# Patient Record
Sex: FEMALE | Race: WHITE | NOT HISPANIC OR LATINO | URBAN - METROPOLITAN AREA
[De-identification: names, ages, dates, MRNs, and addresses within clinical notes are randomized per-mention and may not be internally consistent; named-entity substitution may affect disease eponyms.]

---

## 2017-12-22 ENCOUNTER — APPOINTMENT (RX ONLY)
Dept: URBAN - METROPOLITAN AREA CLINIC 142 | Facility: CLINIC | Age: 82
Setting detail: DERMATOLOGY
End: 2017-12-22

## 2017-12-22 DIAGNOSIS — L81.4 OTHER MELANIN HYPERPIGMENTATION: ICD-10-CM

## 2017-12-22 DIAGNOSIS — D18.0 HEMANGIOMA: ICD-10-CM

## 2017-12-22 DIAGNOSIS — L82.1 OTHER SEBORRHEIC KERATOSIS: ICD-10-CM

## 2017-12-22 DIAGNOSIS — Z85.828 PERSONAL HISTORY OF OTHER MALIGNANT NEOPLASM OF SKIN: ICD-10-CM

## 2017-12-22 DIAGNOSIS — L85.3 XEROSIS CUTIS: ICD-10-CM

## 2017-12-22 PROBLEM — D18.01 HEMANGIOMA OF SKIN AND SUBCUTANEOUS TISSUE: Status: ACTIVE | Noted: 2017-12-22

## 2017-12-22 PROBLEM — D48.5 NEOPLASM OF UNCERTAIN BEHAVIOR OF SKIN: Status: ACTIVE | Noted: 2017-12-22

## 2017-12-22 PROCEDURE — 99214 OFFICE O/P EST MOD 30 MIN: CPT | Mod: 25

## 2017-12-22 PROCEDURE — ? COUNSELING

## 2017-12-22 PROCEDURE — 11100: CPT

## 2017-12-22 PROCEDURE — ? BIOPSY BY SHAVE METHOD

## 2017-12-22 ASSESSMENT — LOCATION ZONE DERM
LOCATION ZONE: LEG
LOCATION ZONE: LIP
LOCATION ZONE: TRUNK
LOCATION ZONE: FACE

## 2017-12-22 ASSESSMENT — LOCATION DETAILED DESCRIPTION DERM
LOCATION DETAILED: RIGHT INFERIOR VERMILION LIP
LOCATION DETAILED: LEFT INFERIOR UPPER BACK
LOCATION DETAILED: LEFT PROXIMAL PRETIBIAL REGION
LOCATION DETAILED: INFERIOR THORACIC SPINE
LOCATION DETAILED: LEFT INFERIOR MEDIAL MALAR CHEEK
LOCATION DETAILED: RIGHT SUPERIOR MEDIAL MIDBACK

## 2017-12-22 ASSESSMENT — LOCATION SIMPLE DESCRIPTION DERM
LOCATION SIMPLE: LEFT UPPER BACK
LOCATION SIMPLE: RIGHT LIP
LOCATION SIMPLE: LEFT PRETIBIAL REGION
LOCATION SIMPLE: RIGHT LOWER BACK
LOCATION SIMPLE: LEFT CHEEK
LOCATION SIMPLE: UPPER BACK

## 2017-12-22 ASSESSMENT — PAIN INTENSITY VAS: HOW INTENSE IS YOUR PAIN 0 BEING NO PAIN, 10 BEING THE MOST SEVERE PAIN POSSIBLE?: NO PAIN

## 2017-12-22 NOTE — PROCEDURE: BIOPSY BY SHAVE METHOD
Billing Type: Third-Party Bill
Type Of Destruction Used: Curettage
X Size Of Lesion In Cm: 0
Electrodesiccation And Curettage Text: The wound bed was treated with electrodesiccation and curettage after the biopsy was performed.
Bill For Surgical Tray: no
Curettage Text: The wound bed was treated with curettage after the biopsy was performed.
Cryotherapy Text: The wound bed was treated with cryotherapy after the biopsy was performed.
Post-Care Instructions: I reviewed with the patient in detail post-care instructions. Patient is to keep the biopsy site dry overnight, and then apply bacitracin twice daily until healed. Patient may apply hydrogen peroxide soaks to remove any crusting.
Biopsy Type: H and E
Lab: [id number]
Notification Instructions: Patient will be notified of biopsy results. However, patient instructed to call the office if not contacted within 2 weeks.
Biopsy Method: Personna blade
Silver Nitrate Text: The wound bed was treated with silver nitrate after the biopsy was performed.
Render Post-Care Instructions In Note?: yes
Wound Care: Vaseline
Hemostasis: Drysol
Anesthesia Type: 1% lidocaine without epinephrine
Detail Level: Detailed
Consent: Verbal consent was obtained and risks were reviewed including but not limited to scarring, infection, bleeding, scabbing, incomplete removal, nerve damage and allergy to anesthesia.
Dressing: pressure dressing with telfa
Electrodesiccation Text: The wound bed was treated with electrodesiccation after the biopsy was performed.
Lab Facility: [id number]
Anesthesia Volume In Cc (Will Not Render If 0): 0.5

## 2018-01-10 ENCOUNTER — APPOINTMENT (RX ONLY)
Dept: URBAN - METROPOLITAN AREA CLINIC 142 | Facility: CLINIC | Age: 83
Setting detail: DERMATOLOGY
End: 2018-01-10

## 2018-01-10 DIAGNOSIS — L57.0 ACTINIC KERATOSIS: ICD-10-CM

## 2018-01-10 PROCEDURE — 17000 DESTRUCT PREMALG LESION: CPT

## 2018-01-10 PROCEDURE — ? LIQUID NITROGEN

## 2018-01-10 ASSESSMENT — PAIN INTENSITY VAS: HOW INTENSE IS YOUR PAIN 0 BEING NO PAIN, 10 BEING THE MOST SEVERE PAIN POSSIBLE?: NO PAIN

## 2018-01-10 ASSESSMENT — LOCATION DETAILED DESCRIPTION DERM: LOCATION DETAILED: LEFT LOWER CUTANEOUS LIP

## 2018-01-10 ASSESSMENT — LOCATION SIMPLE DESCRIPTION DERM: LOCATION SIMPLE: LEFT LIP

## 2018-01-10 ASSESSMENT — LOCATION ZONE DERM: LOCATION ZONE: LIP

## 2018-01-10 NOTE — PROCEDURE: LIQUID NITROGEN
Post-Care Instructions: I reviewed with the patient in detail post-care instructions. Patient is to wear sunprotection, and avoid picking at any of the treated lesions. Pt may apply Vaseline to crusted or scabbing areas.
Render Post-Care Instructions In Note?: yes
Number Of Freeze-Thaw Cycles: 2 freeze-thaw cycles
Detail Level: Detailed
Consent: The patient's verbal consent was obtained including but not limited to risks of crusting, scabbing, blistering, scarring, darker or lighter pigmentary change, recurrence, incomplete removal and infection.
Duration Of Freeze Thaw-Cycle (Seconds): 15

## 2018-02-20 ENCOUNTER — HOSPITAL ENCOUNTER (INPATIENT)
Dept: HOSPITAL 96 - M.ERS | Age: 83
LOS: 1 days | Discharge: HOME | DRG: 194 | End: 2018-02-21
Attending: INTERNAL MEDICINE | Admitting: INTERNAL MEDICINE
Payer: COMMERCIAL

## 2018-02-20 VITALS — SYSTOLIC BLOOD PRESSURE: 158 MMHG | DIASTOLIC BLOOD PRESSURE: 80 MMHG

## 2018-02-20 VITALS — SYSTOLIC BLOOD PRESSURE: 132 MMHG | DIASTOLIC BLOOD PRESSURE: 69 MMHG

## 2018-02-20 VITALS — BODY MASS INDEX: 21.34 KG/M2 | WEIGHT: 125 LBS | HEIGHT: 64.02 IN

## 2018-02-20 VITALS — SYSTOLIC BLOOD PRESSURE: 143 MMHG | DIASTOLIC BLOOD PRESSURE: 63 MMHG

## 2018-02-20 VITALS — DIASTOLIC BLOOD PRESSURE: 88 MMHG | SYSTOLIC BLOOD PRESSURE: 208 MMHG

## 2018-02-20 DIAGNOSIS — S09.90XA: ICD-10-CM

## 2018-02-20 DIAGNOSIS — Z90.11: ICD-10-CM

## 2018-02-20 DIAGNOSIS — E03.9: ICD-10-CM

## 2018-02-20 DIAGNOSIS — E44.0: ICD-10-CM

## 2018-02-20 DIAGNOSIS — Z88.5: ICD-10-CM

## 2018-02-20 DIAGNOSIS — M19.90: ICD-10-CM

## 2018-02-20 DIAGNOSIS — J09.X2: Primary | ICD-10-CM

## 2018-02-20 DIAGNOSIS — I10: ICD-10-CM

## 2018-02-20 DIAGNOSIS — Z88.2: ICD-10-CM

## 2018-02-20 DIAGNOSIS — Z91.041: ICD-10-CM

## 2018-02-20 DIAGNOSIS — R26.81: ICD-10-CM

## 2018-02-20 DIAGNOSIS — Z79.899: ICD-10-CM

## 2018-02-20 LAB
ABSOLUTE BASOPHILS: 0.1 THOU/UL (ref 0–0.2)
ABSOLUTE EOSINOPHILS: 0 THOU/UL (ref 0–0.7)
ABSOLUTE MONOCYTES: 0.5 THOU/UL (ref 0–1.2)
ALBUMIN SERPL-MCNC: 3.7 G/DL (ref 3.4–5)
ALP SERPL-CCNC: 65 U/L (ref 46–116)
ALT SERPL-CCNC: 18 U/L (ref 30–65)
ANION GAP SERPL CALC-SCNC: 7 MMOL/L (ref 7–16)
AST SERPL-CCNC: 18 U/L (ref 15–37)
BASOPHILS NFR BLD AUTO: 1.2 %
BILIRUB SERPL-MCNC: 0.6 MG/DL
BUN SERPL-MCNC: 15 MG/DL (ref 7–18)
CALCIUM SERPL-MCNC: 9.6 MG/DL (ref 8.5–10.1)
CHLORIDE SERPL-SCNC: 103 MMOL/L (ref 98–107)
CO2 SERPL-SCNC: 29 MMOL/L (ref 21–32)
CREAT SERPL-MCNC: 1.1 MG/DL (ref 0.6–1.3)
EOSINOPHIL NFR BLD: 0.2 %
GLUCOSE SERPL-MCNC: 101 MG/DL (ref 70–99)
GRANULOCYTES NFR BLD MANUAL: 75.2 %
HCT VFR BLD CALC: 43.7 % (ref 37–47)
HGB BLD-MCNC: 14.5 GM/DL (ref 12–15)
LYMPHOCYTES # BLD: 0.8 THOU/UL (ref 0.8–5.3)
LYMPHOCYTES NFR BLD AUTO: 15.1 %
MCH RBC QN AUTO: 29.1 PG (ref 26–34)
MCHC RBC AUTO-ENTMCNC: 33.2 G/DL (ref 28–37)
MCV RBC: 87.7 FL (ref 80–100)
MONOCYTES NFR BLD: 8.3 %
MPV: 7.4 FL. (ref 7.2–11.1)
NEUTROPHILS # BLD: 4.1 THOU/UL (ref 1.6–8.1)
NUCLEATED RBCS: 0 /100WBC
PLATELET COUNT*: 275 THOU/UL (ref 150–400)
POTASSIUM SERPL-SCNC: 4.1 MMOL/L (ref 3.5–5.1)
PROT SERPL-MCNC: 7.1 G/DL (ref 6.4–8.2)
RBC # BLD AUTO: 4.98 MIL/UL (ref 4.2–5)
RDW-CV: 15.3 % (ref 10.5–14.5)
SODIUM SERPL-SCNC: 139 MMOL/L (ref 136–145)
TROPONIN-I LEVEL: <0.06 NG/ML (ref ?–0.06)
WBC # BLD AUTO: 5.5 THOU/UL (ref 4–11)

## 2018-02-20 NOTE — EKG
[place] [address]
Phone:  [phone]                     ELECTROCARDIOGRAM REPORT      
_______________________________________________________________________________
 
Name:       SHAKIRA LEE                     Room:           [address]    ADM [place]  
M.R.#:  [id number]      Account #:      [id number]  
Admission:  18     Attend Phys:    Rosi Chance MD 
Discharge:               Date of Birth:  [birth year]  
         Report #: [id number]
    [id number]
_______________________________________________________________________________
THIS REPORT FOR:  //name//                      
 
                         [facility] ED
                                       
Test Date:    2018               Test Time:    12:48:53
Pat Name:     SHAKIRA LEE               Department:   
Patient ID:   [id number]            Room:         [facility]
Gender:       F                        Technician:   PHILOMENA SALGADO
:          1934               Requested By: Keisha Rasmussen
Order Number: [id number] MD:   Hamiltno Loving
                                 Measurements
Intervals                              Axis          
Rate:         65                       P:            55
CT:           169                      QRS:          7
QRSD:         94                       T:            39
QT:           399                                    
QTc:          415                                    
                           Interpretive Statements
Sinus rhythm
Possible left atrial enlargement
Compared to ECG 2011 08:44:51
No significant changes
 
Electronically Signed On 2018 16:05:08 CST by Hmailton Loving
[email or web address]
 
 
 
 
 
 
 
 
 
 
 
 
 
 
 
 
 
 
  <ELECTRONICALLY SIGNED>
                                           By: Hamilton Loving MD, [facility]   
  18     1605
D: 18 1248   _____________________________________
T: 18 1248   Hamilton Loving MD, FACC     /EPI

## 2018-02-21 VITALS — SYSTOLIC BLOOD PRESSURE: 208 MMHG | DIASTOLIC BLOOD PRESSURE: 83 MMHG

## 2018-02-21 VITALS — SYSTOLIC BLOOD PRESSURE: 132 MMHG | DIASTOLIC BLOOD PRESSURE: 73 MMHG

## 2018-02-21 VITALS — SYSTOLIC BLOOD PRESSURE: 150 MMHG | DIASTOLIC BLOOD PRESSURE: 72 MMHG

## 2018-02-21 VITALS — SYSTOLIC BLOOD PRESSURE: 151 MMHG | DIASTOLIC BLOOD PRESSURE: 54 MMHG

## 2018-02-21 VITALS — DIASTOLIC BLOOD PRESSURE: 58 MMHG | SYSTOLIC BLOOD PRESSURE: 148 MMHG

## 2018-02-21 LAB
ALBUMIN SERPL-MCNC: 3 G/DL (ref 3.4–5)
ALP SERPL-CCNC: 53 U/L (ref 46–116)
ALT SERPL-CCNC: 14 U/L (ref 30–65)
ANION GAP SERPL CALC-SCNC: 8 MMOL/L (ref 7–16)
AST SERPL-CCNC: 19 U/L (ref 15–37)
BILIRUB SERPL-MCNC: 0.5 MG/DL
BILIRUB UR-MCNC: NEGATIVE MG/DL
BUN SERPL-MCNC: 14 MG/DL (ref 7–18)
CALCIUM SERPL-MCNC: 9 MG/DL (ref 8.5–10.1)
CHLORIDE SERPL-SCNC: 108 MMOL/L (ref 98–107)
CO2 SERPL-SCNC: 27 MMOL/L (ref 21–32)
COLOR UR: YELLOW
CREAT SERPL-MCNC: 1 MG/DL (ref 0.6–1.3)
GLUCOSE SERPL-MCNC: 85 MG/DL (ref 70–99)
HCT VFR BLD CALC: 39.6 % (ref 37–47)
HGB BLD-MCNC: 13 GM/DL (ref 12–15)
KETONES UR STRIP-MCNC: NEGATIVE MG/DL
MCH RBC QN AUTO: 28.6 PG (ref 26–34)
MCHC RBC AUTO-ENTMCNC: 32.9 G/DL (ref 28–37)
MCV RBC: 87 FL (ref 80–100)
MPV: 7.4 FL. (ref 7.2–11.1)
PLATELET COUNT*: 244 THOU/UL (ref 150–400)
POTASSIUM SERPL-SCNC: 4.1 MMOL/L (ref 3.5–5.1)
PROT SERPL-MCNC: 5.4 G/DL (ref 6.4–8.2)
PROT UR QL STRIP: NEGATIVE
RBC # BLD AUTO: 4.55 MIL/UL (ref 4.2–5)
RBC # UR STRIP: NEGATIVE /UL
RDW-CV: 15.4 % (ref 10.5–14.5)
SODIUM SERPL-SCNC: 143 MMOL/L (ref 136–145)
SP GR UR STRIP: 1.01 (ref 1–1.03)
URINE CLARITY: CLEAR
URINE GLUCOSE-RANDOM: NEGATIVE
URINE LEUKOCYTES-REFLEX: NEGATIVE
URINE NITRITE-REFLEX: NEGATIVE
UROBILINOGEN UR STRIP-ACNC: 0.2 E.U./DL (ref 0.2–1)
WBC # BLD AUTO: 3.7 THOU/UL (ref 4–11)

## 2018-06-26 ENCOUNTER — HOSPITAL ENCOUNTER (OUTPATIENT)
Dept: HOSPITAL 96 - M.LAB | Age: 83
End: 2018-06-26
Attending: FAMILY MEDICINE
Payer: COMMERCIAL

## 2018-06-26 DIAGNOSIS — G93.9: Primary | ICD-10-CM

## 2018-06-26 DIAGNOSIS — R90.82: ICD-10-CM

## 2018-06-26 LAB
BUN SERPL-MCNC: 21 MG/DL (ref 7–18)
CREAT SERPL-MCNC: 1 MG/DL (ref 0.6–1.3)

## 2018-08-13 ENCOUNTER — APPOINTMENT (RX ONLY)
Dept: URBAN - METROPOLITAN AREA CLINIC 142 | Facility: CLINIC | Age: 83
Setting detail: DERMATOLOGY
End: 2018-08-13

## 2018-08-13 DIAGNOSIS — L81.4 OTHER MELANIN HYPERPIGMENTATION: ICD-10-CM

## 2018-08-13 DIAGNOSIS — L82.0 INFLAMED SEBORRHEIC KERATOSIS: ICD-10-CM

## 2018-08-13 DIAGNOSIS — D69.2 OTHER NONTHROMBOCYTOPENIC PURPURA: ICD-10-CM

## 2018-08-13 DIAGNOSIS — Z85.828 PERSONAL HISTORY OF OTHER MALIGNANT NEOPLASM OF SKIN: ICD-10-CM

## 2018-08-13 DIAGNOSIS — D18.0 HEMANGIOMA: ICD-10-CM

## 2018-08-13 DIAGNOSIS — L82.1 OTHER SEBORRHEIC KERATOSIS: ICD-10-CM

## 2018-08-13 PROBLEM — D18.01 HEMANGIOMA OF SKIN AND SUBCUTANEOUS TISSUE: Status: ACTIVE | Noted: 2018-08-13

## 2018-08-13 PROCEDURE — ? COUNSELING

## 2018-08-13 PROCEDURE — ? LIQUID NITROGEN

## 2018-08-13 PROCEDURE — 99214 OFFICE O/P EST MOD 30 MIN: CPT | Mod: 25

## 2018-08-13 PROCEDURE — 17110 DESTRUCTION B9 LES UP TO 14: CPT

## 2018-08-13 ASSESSMENT — LOCATION DETAILED DESCRIPTION DERM
LOCATION DETAILED: EPIGASTRIC SKIN
LOCATION DETAILED: LEFT INFERIOR UPPER BACK
LOCATION DETAILED: RIGHT SUPERIOR MEDIAL UPPER BACK
LOCATION DETAILED: LEFT INFERIOR LATERAL NECK
LOCATION DETAILED: RIGHT PROXIMAL DORSAL FOREARM
LOCATION DETAILED: LEFT MEDIAL SUPERIOR CHEST
LOCATION DETAILED: LEFT DISTAL RADIAL DORSAL FOREARM
LOCATION DETAILED: LEFT SUPEROLATERAL SOFT PALATE
LOCATION DETAILED: LEFT DISTAL PRETIBIAL REGION

## 2018-08-13 ASSESSMENT — LOCATION SIMPLE DESCRIPTION DERM
LOCATION SIMPLE: LEFT UPPER BACK
LOCATION SIMPLE: CHEST
LOCATION SIMPLE: LEFT FOREARM
LOCATION SIMPLE: LEFT ANTERIOR NECK
LOCATION SIMPLE: ABDOMEN
LOCATION SIMPLE: RIGHT UPPER BACK
LOCATION SIMPLE: RIGHT FOREARM
LOCATION SIMPLE: LEFT SOFT PALATE
LOCATION SIMPLE: LEFT PRETIBIAL REGION

## 2018-08-13 ASSESSMENT — LOCATION ZONE DERM
LOCATION ZONE: LEG
LOCATION ZONE: TRUNK
LOCATION ZONE: ARM
LOCATION ZONE: OROPHARYNX
LOCATION ZONE: NECK

## 2018-08-13 ASSESSMENT — PAIN INTENSITY VAS: HOW INTENSE IS YOUR PAIN 0 BEING NO PAIN, 10 BEING THE MOST SEVERE PAIN POSSIBLE?: NO PAIN

## 2018-08-13 NOTE — PROCEDURE: LIQUID NITROGEN
Duration Of Freeze Thaw-Cycle (Seconds): 15
Add 52 Modifier (Optional): no
Medical Necessity Information: It is in your best interest to select a reason for this procedure from the list below. All of these items fulfill various CMS LCD requirements except the new and changing color options.
Render Post-Care Instructions In Note?: yes
Number Of Freeze-Thaw Cycles: 1 freeze-thaw cycle
Consent: The patient's verbal consent was obtained including but not limited to risks of crusting, scabbing, blistering, scarring, darker or lighter pigmentary change, recurrence, incomplete removal and infection.
Medical Necessity Clause: This procedure was medically necessary because the lesions that were treated were:
Post-Care Instructions: I reviewed with the patient in detail post-care instructions. Patient is to wear sunprotection, and avoid picking at any of the treated lesions. Pt may apply Vaseline to crusted or scabbing areas.
Detail Level: Detailed

## 2018-09-13 ENCOUNTER — HOSPITAL ENCOUNTER (INPATIENT)
Dept: HOSPITAL 96 - M.ERS | Age: 83
LOS: 4 days | Discharge: HOME | DRG: 65 | End: 2018-09-17
Attending: INTERNAL MEDICINE | Admitting: INTERNAL MEDICINE
Payer: COMMERCIAL

## 2018-09-13 VITALS — WEIGHT: 126 LBS | BODY MASS INDEX: 21.51 KG/M2 | HEIGHT: 64.02 IN

## 2018-09-13 VITALS — SYSTOLIC BLOOD PRESSURE: 225 MMHG | DIASTOLIC BLOOD PRESSURE: 96 MMHG

## 2018-09-13 VITALS — DIASTOLIC BLOOD PRESSURE: 80 MMHG | SYSTOLIC BLOOD PRESSURE: 179 MMHG

## 2018-09-13 VITALS — SYSTOLIC BLOOD PRESSURE: 191 MMHG | DIASTOLIC BLOOD PRESSURE: 81 MMHG

## 2018-09-13 VITALS — SYSTOLIC BLOOD PRESSURE: 196 MMHG | DIASTOLIC BLOOD PRESSURE: 71 MMHG

## 2018-09-13 VITALS — DIASTOLIC BLOOD PRESSURE: 95 MMHG | SYSTOLIC BLOOD PRESSURE: 227 MMHG

## 2018-09-13 DIAGNOSIS — Z90.49: ICD-10-CM

## 2018-09-13 DIAGNOSIS — I67.4: ICD-10-CM

## 2018-09-13 DIAGNOSIS — Z79.82: ICD-10-CM

## 2018-09-13 DIAGNOSIS — I16.1: ICD-10-CM

## 2018-09-13 DIAGNOSIS — Z88.6: ICD-10-CM

## 2018-09-13 DIAGNOSIS — Z91.041: ICD-10-CM

## 2018-09-13 DIAGNOSIS — R20.2: ICD-10-CM

## 2018-09-13 DIAGNOSIS — Z90.11: ICD-10-CM

## 2018-09-13 DIAGNOSIS — I16.0: ICD-10-CM

## 2018-09-13 DIAGNOSIS — Z85.818: ICD-10-CM

## 2018-09-13 DIAGNOSIS — E03.9: ICD-10-CM

## 2018-09-13 DIAGNOSIS — I10: ICD-10-CM

## 2018-09-13 DIAGNOSIS — Z88.2: ICD-10-CM

## 2018-09-13 DIAGNOSIS — Z79.899: ICD-10-CM

## 2018-09-13 DIAGNOSIS — Z85.3: ICD-10-CM

## 2018-09-13 DIAGNOSIS — H81.49: ICD-10-CM

## 2018-09-13 DIAGNOSIS — M19.90: ICD-10-CM

## 2018-09-13 DIAGNOSIS — E78.5: ICD-10-CM

## 2018-09-13 DIAGNOSIS — I63.9: Primary | ICD-10-CM

## 2018-09-13 LAB
ALBUMIN SERPL-MCNC: 3.5 G/DL (ref 3.4–5)
ALP SERPL-CCNC: 61 U/L (ref 46–116)
ALT SERPL-CCNC: 19 U/L (ref 30–65)
ANION GAP SERPL CALC-SCNC: 6 MMOL/L (ref 7–16)
APTT BLD: 25.7 SECONDS (ref 25–31.3)
AST SERPL-CCNC: 17 U/L (ref 15–37)
BILIRUB SERPL-MCNC: 0.3 MG/DL
BILIRUB UR-MCNC: NEGATIVE MG/DL
BUN SERPL-MCNC: 15 MG/DL (ref 7–18)
CALCIUM SERPL-MCNC: 8.8 MG/DL (ref 8.5–10.1)
CHLORIDE SERPL-SCNC: 101 MMOL/L (ref 98–107)
CO2 SERPL-SCNC: 28 MMOL/L (ref 21–32)
COLOR UR: YELLOW
CREAT SERPL-MCNC: 1.2 MG/DL (ref 0.6–1.3)
GLUCOSE SERPL-MCNC: 103 MG/DL (ref 70–99)
HCT VFR BLD CALC: 42.8 % (ref 37–47)
HGB BLD-MCNC: 14.1 GM/DL (ref 12–15)
INR PPP: 1
KETONES UR STRIP-MCNC: NEGATIVE MG/DL
MCH RBC QN AUTO: 28.8 PG (ref 26–34)
MCHC RBC AUTO-ENTMCNC: 33 G/DL (ref 28–37)
MCV RBC: 87.3 FL (ref 80–100)
MPV: 7.3 FL. (ref 7.2–11.1)
NITRITE UR QL STRIP: NEGATIVE
PLATELET COUNT*: 251 THOU/UL (ref 150–400)
POTASSIUM SERPL-SCNC: 3.8 MMOL/L (ref 3.5–5.1)
PROT SERPL-MCNC: 6.8 G/DL (ref 6.4–8.2)
PROT UR QL STRIP: NEGATIVE
PROTHROMBIN TIME: 10 SECONDS (ref 9.2–11.5)
RBC # BLD AUTO: 4.9 MIL/UL (ref 4.2–5)
RBC # UR STRIP: NEGATIVE /UL
RDW-CV: 15.4 % (ref 10.5–14.5)
SODIUM SERPL-SCNC: 135 MMOL/L (ref 136–145)
SP GR UR STRIP: <= 1.005 (ref 1–1.03)
SQUAMOUS: (no result) /LPF (ref 0–3)
TROPONIN-I LEVEL: <0.06 NG/ML (ref ?–0.06)
URINE CLARITY: CLEAR
URINE GLUCOSE-RANDOM: NEGATIVE
URINE LEUKOCYTES: (no result)
UROBILINOGEN UR STRIP-ACNC: 0.2 E.U./DL (ref 0.2–1)
WBC # BLD AUTO: 5 THOU/UL (ref 4–11)
WBC #/AREA URNS HPF: (no result) /HPF (ref 0–5)

## 2018-09-14 VITALS — DIASTOLIC BLOOD PRESSURE: 64 MMHG | SYSTOLIC BLOOD PRESSURE: 144 MMHG

## 2018-09-14 VITALS — DIASTOLIC BLOOD PRESSURE: 92 MMHG | SYSTOLIC BLOOD PRESSURE: 179 MMHG

## 2018-09-14 VITALS — DIASTOLIC BLOOD PRESSURE: 79 MMHG | SYSTOLIC BLOOD PRESSURE: 150 MMHG

## 2018-09-14 VITALS — DIASTOLIC BLOOD PRESSURE: 69 MMHG | SYSTOLIC BLOOD PRESSURE: 167 MMHG

## 2018-09-14 VITALS — DIASTOLIC BLOOD PRESSURE: 75 MMHG | SYSTOLIC BLOOD PRESSURE: 148 MMHG

## 2018-09-14 VITALS — SYSTOLIC BLOOD PRESSURE: 191 MMHG | DIASTOLIC BLOOD PRESSURE: 66 MMHG

## 2018-09-14 VITALS — SYSTOLIC BLOOD PRESSURE: 149 MMHG | DIASTOLIC BLOOD PRESSURE: 60 MMHG

## 2018-09-14 VITALS — DIASTOLIC BLOOD PRESSURE: 50 MMHG | SYSTOLIC BLOOD PRESSURE: 123 MMHG

## 2018-09-14 LAB
ANION GAP SERPL CALC-SCNC: 6 MMOL/L (ref 7–16)
BUN SERPL-MCNC: 13 MG/DL (ref 7–18)
CALCIUM SERPL-MCNC: 9 MG/DL (ref 8.5–10.1)
CHLORIDE SERPL-SCNC: 104 MMOL/L (ref 98–107)
CHOLEST SERPL-MCNC: 218 MG/DL (ref ?–200)
CO2 SERPL-SCNC: 29 MMOL/L (ref 21–32)
CREAT SERPL-MCNC: 1.2 MG/DL (ref 0.6–1.3)
ESR (SEDRATE): 8 MM/HR (ref 0–30)
EST. AVERAGE GLUCOSE BLD GHB EST-MCNC: 103 MG/DL
GLUCOSE SERPL-MCNC: 90 MG/DL (ref 70–99)
GLYCOHEMOGLOBIN (HGB A1C): 5.2 % (ref 4.8–5.6)
HCT VFR BLD CALC: 40.7 % (ref 37–47)
HDLC SERPL-MCNC: 79 MG/DL (ref 40–?)
HGB BLD-MCNC: 13.6 GM/DL (ref 12–15)
LDLC SERPL-MCNC: 131 MG/DL (ref ?–100)
MAGNESIUM SERPL-MCNC: 2.2 MG/DL (ref 1.8–2.4)
MCH RBC QN AUTO: 29.4 PG (ref 26–34)
MCHC RBC AUTO-ENTMCNC: 33.4 G/DL (ref 28–37)
MCV RBC: 88 FL (ref 80–100)
MPV: 7.4 FL. (ref 7.2–11.1)
PLATELET COUNT*: 240 THOU/UL (ref 150–400)
POTASSIUM SERPL-SCNC: 4.1 MMOL/L (ref 3.5–5.1)
RBC # BLD AUTO: 4.63 MIL/UL (ref 4.2–5)
RDW-CV: 15.1 % (ref 10.5–14.5)
SODIUM SERPL-SCNC: 139 MMOL/L (ref 136–145)
TC:HDL: 2.8 RATIO
TRIGL SERPL-MCNC: 44 MG/DL (ref ?–150)
VLDLC SERPL CALC-MCNC: 9 MG/DL (ref ?–40)
WBC # BLD AUTO: 4.6 THOU/UL (ref 4–11)

## 2018-09-14 NOTE — EKG
[place] [address]
Phone:  [phone]                     ELECTROCARDIOGRAM REPORT      
_______________________________________________________________________________
 
Name:       SHAKIRA LEE                     Room:           [address]    ADM IN  
.R.#:  [id number]      Account #:      [id number]  
Admission:  18     Attend Phys:    Guru Hopkins MD 
Discharge:               Date of Birth:  [birth year]  
         Report #: [id number]
    [id number]
_______________________________________________________________________________
THIS REPORT FOR:  //name//                      
 
                         [facility] ED
                                       
Test Date:    2018               Test Time:    17:50:51
Pat Name:     SHAKIRA LEE               Department:   
Patient ID:   [id number]            Room:         [facility]
Gender:       F                        Technician:   
:          1934               Requested By: Ernestine Ackerman
Order Number: [id number] MD:   Rubén Napier
                                 Measurements
Intervals                              Axis          
Rate:         76                       P:            76
MD:           169                      QRS:          -2
QRSD:         96                       T:            38
QT:           366                                    
QTc:          412                                    
                           Interpretive Statements
Sinus rhythm
Compared to ECG 2018 12:48:53
No significant changes
 
Electronically Signed On 2018 10:55:10 CDT by Rubén Napier
[email or web address]
 
 
 
 
 
 
 
 
 
 
 
 
 
 
 
 
 
 
 
  <ELECTRONICALLY SIGNED>
                                           By: Rubén Napier MD, [facility]      
  18     1055
D: 18 1750   _____________________________________
T: 18 1750   Rubén Napier MD, [facility]        /EPI

## 2018-09-14 NOTE — 2DMMODE
[place] [address]
Phone:  [phone] 2 D/M-MODE ECHOCARDIOGRAM     
_______________________________________________________________________________
 
Name:         SHAKIRA LEE                    Room:          [address] [place] 
[facility]#:    [id number]     Account #:     [id number]  
Admission:    18    Attend Phys:   Guru Hopkins, 
Discharge:                Date of Birth: [birth year]  
Date of Service: 18 1131  Report #:      [id number]
        [id number]
_______________________________________________________________________________
THIS REPORT FOR:  //name//                      
 
 
--------------- APPROVED REPORT --------------
 
 
Study performed:  2018 10:48:31
 
EXAM: Comprehensive 2D, Doppler, and color-flow 
Echocardiogram 
Patient Location: In-Patient   
Room #:  [facility]     Status:  routine
 
      BSA:         1.62
HR: 68 bpm BP:          144/64 mmHg 
Rhythm: NSR     
 
Other Information 
Study Quality: Excellent
 
Indications
CVA/TIA 
 
Echo Enhancing Agent
Indication: Rule out Shunt
Agent(s) / Amount(s) Used: Agitated Saline 10 cc
 
2D Dimensions
IVSd:  12.81 (7-11mm) LVOT Diam:  19.92 (18-24mm) 
LVDd:  39.74 mm  
PWd:  10.28 (7-11mm) Ascending Ao:  29.09 (22-36mm)
LVDs:  22.44 (25-40mm) 
Aortic Root:  29.07 mm 
 
Volumes
Left Atrial Volume (Systole) 
    LA ESV Index:  26.50 mL/m2
 
Aortic Valve
AoV Peak Sagar.:  1.49 m/s 
AO Peak Gr.:  8.82 mmHg  LVOT Max P.42 mmHg
AO Mean Gr.:  4.73 mmHg  LVOT Mean P.00 mmHg
    LVOT Max V:  1.05 m/s
AO V2 VTI:  31.02 cm  LVOT Mean V:  0.64 m/s
ROSEANN (VTI):  2.36 cm2  LVOT V1 VTI:  23.47 cm
 
 
 
[place] [address]
Phone:  [phone]                     2 D/M-MODE ECHOCARDIOGRAM     
_______________________________________________________________________________
 
Name:         BRYAN LEENA                    Room:          [address] [place] 
..#:    [id number]     Account #:     [id number]  
Admission:    18    Attend Phys:   Guru Hopkins, 
Discharge:                Date of Birth: [birth year]  
Date of Service: 18 1131  Report #:      [id number]
        [id number]
_______________________________________________________________________________
Mitral Valve
    E/A Ratio:  0.58
    MV Decel. Time:  335.65 ms
MV E Max Sagar.:  0.56 m/s 
MV PHT:  97.34 ms  
MVA (PHT):  2.26 cm2  
 
TDI
E/Lateral E':  7.00 E/Medial E':  8.00
   Medial E' Sagar.:  0.07 m/s
   Lateral E' Sagar.:  0.08 m/s
 
Pulmonary Valve
PV Peak Sagar.:  0.92 m/s PV Peak Gr.:  3.36 mmHg
 
Tricuspid Valve
    RAP Estimate:  5.00 mmHg
TR Peak Gr.:  18.51 mmHg RVSP:  23.50 mmHg
    PA Pressure:  23.50 mmHg
 
Left Ventricle
The left ventricle is normal size. There is normal LV segmental wall 
motion. There is normal left ventricular wall thickness. Left 
ventricular systolic function is normal. The left ventricular 
ejection fraction is within the normal range. LVEF is 60-65%. The 
left ventricular diastolic function is normal.
 
Right Ventricle
The right ventricle is normal size. The right ventricular systolic 
function is normal.
 
Atria
The left atrium size is normal. The right atrium size is 
normal.
 
Aortic Valve
Mild aortic valve sclerosis. No aortic regurgitation is present. 
There is no aortic valvular stenosis.
 
Mitral Valve
The mitral valve is normal in structure. There is no mitral valve 
regurgitation noted. No evidence of mitral valve stenosis.
 
Tricuspid Valve
The tricuspid valve is normal in structure. Trace tricuspid 
regurgitation. No pulmonary hypertension.
 
 
[place] [address]
Phone:  [phone]                     2 D/M-MODE ECHOCARDIOGRAM     
_______________________________________________________________________________
 
Name:         SHAKIRA LEE                    Room:          [address] [place] 
[facility]#:    [id number]     Account #:     [id number]  
Admission:    18    Attend Phys:   Guru Hopkins, 
Discharge:                Date of Birth: [birth year]  
Date of Service: 18 1131  Report #:      [id number]
        [id number]
_______________________________________________________________________________
 
Pulmonic Valve
The pulmonary valve is normal in structure. There is no pulmonic 
valvular regurgitation.
 
Great Vessels
The aortic root is normal in size. IVC is normal in size and 
collapses with &gt;50% inspiration
 
Pericardium
There is no pericardial effusion.
 
&lt;Conclusion&gt;
Left ventricular systolic function is normal.
The left ventricular ejection fraction is within the normal range.
 
 
 
 
 
 
 
 
 
 
 
 
 
 
 
 
 
 
 
 
 
 
 
 
 
 
 
 
 
  <ELECTRONICALLY SIGNED>
                                           By: Rubén Napier MD, FACC      
  18     1131
D: 18 1131   _____________________________________
T: 18 113   Rubén Napier MD, FACC        /INF

## 2018-09-15 VITALS — SYSTOLIC BLOOD PRESSURE: 166 MMHG | DIASTOLIC BLOOD PRESSURE: 76 MMHG

## 2018-09-15 VITALS — SYSTOLIC BLOOD PRESSURE: 190 MMHG | DIASTOLIC BLOOD PRESSURE: 75 MMHG

## 2018-09-15 VITALS — DIASTOLIC BLOOD PRESSURE: 56 MMHG | SYSTOLIC BLOOD PRESSURE: 137 MMHG

## 2018-09-15 VITALS — DIASTOLIC BLOOD PRESSURE: 66 MMHG | SYSTOLIC BLOOD PRESSURE: 177 MMHG

## 2018-09-15 VITALS — DIASTOLIC BLOOD PRESSURE: 72 MMHG | SYSTOLIC BLOOD PRESSURE: 181 MMHG

## 2018-09-15 VITALS — SYSTOLIC BLOOD PRESSURE: 166 MMHG | DIASTOLIC BLOOD PRESSURE: 73 MMHG

## 2018-09-16 VITALS — DIASTOLIC BLOOD PRESSURE: 54 MMHG | SYSTOLIC BLOOD PRESSURE: 188 MMHG

## 2018-09-16 VITALS — SYSTOLIC BLOOD PRESSURE: 125 MMHG | DIASTOLIC BLOOD PRESSURE: 65 MMHG

## 2018-09-16 VITALS — DIASTOLIC BLOOD PRESSURE: 66 MMHG | SYSTOLIC BLOOD PRESSURE: 158 MMHG

## 2018-09-16 VITALS — DIASTOLIC BLOOD PRESSURE: 82 MMHG | SYSTOLIC BLOOD PRESSURE: 189 MMHG

## 2018-09-16 VITALS — SYSTOLIC BLOOD PRESSURE: 182 MMHG | DIASTOLIC BLOOD PRESSURE: 72 MMHG

## 2018-09-16 VITALS — DIASTOLIC BLOOD PRESSURE: 80 MMHG | SYSTOLIC BLOOD PRESSURE: 184 MMHG

## 2018-09-17 VITALS — SYSTOLIC BLOOD PRESSURE: 126 MMHG | DIASTOLIC BLOOD PRESSURE: 52 MMHG

## 2018-09-17 VITALS — SYSTOLIC BLOOD PRESSURE: 154 MMHG | DIASTOLIC BLOOD PRESSURE: 69 MMHG

## 2018-09-17 VITALS — SYSTOLIC BLOOD PRESSURE: 161 MMHG | DIASTOLIC BLOOD PRESSURE: 68 MMHG

## 2018-09-17 VITALS — DIASTOLIC BLOOD PRESSURE: 69 MMHG | SYSTOLIC BLOOD PRESSURE: 131 MMHG

## 2018-09-21 NOTE — CON
[address]  [address]                    CONSULTATION                  
_______________________________________________________________________________
 
Name:       SHAKIRA LEE                     Room:           [address] [place]  
M.R.#:  [id number]      Account #:      [id number]  
Admission:  09/13/18     Attend Phys:    Guru Hopkins MD 
Discharge:  09/17/18     Date of Birth:  [birth year]  
         Report #: [id number]
                                                                     [id number]  
_______________________________________________________________________________
THIS REPORT FOR:  //name//                      
 
CC: Guru Severino Pennieankit
 
DATE OF SERVICE:  09/14/2018
 
 
HISTORY OF PRESENT ILLNESS:  This is an 84-year-old female patient for whom a
Neurology consultation was done tonight.  The history originally I got from the
nurse is that they noticed numbness in the left arm around 7:45.  That history
is not clear by talking to the patient as I will explain later.  As I understood
from them, subsequently the patient tried to walk and they noticed weakness
because she was not able to walk.  As per protocol, code stroke was activated
and since it was after hours as per protocol, emergency room physician was
called.  Dr. Wyatt saw the patient as a stroke protocol and evaluated the patient.
 He did a noncontrast CT of the head and it was unremarkable.  He evaluated the
patient for TPA.  The patient was outside the window for 3 hours and as I
understood from him, she did not qualify for 4-1/2 hour window because she was
more than 80-year-old and as per protocol, she was excluded from the 4-1/2 hour
window.  As I understood from him, he ordered a CT stroke protocol.  He also
recommended they contact me and the floor contacted me around 11:30 and around
that time is the one I talked to Dr. Wyatt.  History is basically as summarized
above.
 
When I asked the patient about the timing, she is not sure.  She actually says
the numbness may have happened even before 7 o'clock.  She did not realize she
was weak till she tried to walk, so there may be some neglect there and the
symptom may be going on much longer than previously thought and the time cannot
be determined with accuracy.  She is weak, but still able to move the left side.
 She is numb.  I talked to the nurses, her blood pressure was about 120
systolic, just prior to when they took it and during the episode, the blood
pressure was still relatively low and the systolic was in 140s.  When the
patient came in, her blood pressure was 225/96.  The last time the patient took
her blood pressure was on Tuesday and she said it was very high.  She did not
take any medical action and just took one aspirin.  Prior to that, she has not
taken her blood pressure for a long time.  So it looked like this patient is
most likely a longstanding hypertensive where her blood pressure is uncontrolled
and high for long time and it would be desirable to assume that further
management.
 
It is a difficult situation in this patient.  She does have a new onset of
atrial fibrillation and she has a pretty significant intracranial disease in the
vasculature.
 
As mentioned above, I had discussed the patient with Dr. Wyatt and the patient was
 
 
 
[address]  [address]                    CONSULTATION                  
_______________________________________________________________________________
 
Name:       SHAKIRA LEE                     Room:           [address] [place]  
[facility].#:  [id number]      Account #:      [id number]  
Admission:  09/13/18     Attend Phys:    Guru Hopkins MD 
Discharge:  09/17/18     Date of Birth:  [birth year]  
         Report #: [id number]
                                                                     [id number]  
_______________________________________________________________________________
not within 3-hour window for TPA and she was not given TPA.  He had ordered
further workup to see if any emboli is there.  Her GFR is 43.
 
It looks like this patient had a stroke.  The stroke appeared to be most likely
secondary to intracranial disease and her inability to tolerate the lower blood
pressure because most likely intracranial circulation is accustomed to high
blood pressure she has.  We may have to lower the blood pressure slowly over a
period of time.  Cardio embolization because of atrial fibrillation is possible,
but the symptom does not appear to be hyperacute.
 
I discussed both aggressive and nonaggressive management with this patient.  I
discussed the pros and cons of that.  After discussing all the options with her,
I agree that she is not a TPA candidate, but we should evaluate her for stroke
and because of that, I went ahead and ordered an MRI stat in this patient.  We
can do a CT angio with contrast with a GFR of 43, but MRI will be safer.  I
personally do not think she is going to be a candidate for anything.  I did give
her all the options including the options of TPA and the fact that she can
become worse.  She understood that.  She also want to avoid TPA and she is not
in the time frame of TPA.  I am not sure if she is in time frame for anything
else or anything else we will find which will change the treatment, but we will
make an attempt.
 
I did give her a bolus of fluid to bump up her blood pressure.  I asked the
nurses to keep her flat.  They should not raise her head till the blood pressure
is definitely more than 150, but even then try to keep her flat.  Further
management will depend upon the MRI results.  It will be desirable to keep her
blood pressure high for next several days.
 
More than 35 minutes of time was spent taking care of this patient today and
majority of that time was spent counseling the patient on multiple options and
coordinating her care.
 
 
 
 
 
 
 
 
 
 
 
 
 
<ELECTRONICALLY SIGNED>
                                        By:  Aly Avalos MD             
09/21/18     0932
D: 09/15/18 0138_______________________________________
T: 09/15/18 0349MD mary Naylor

## 2018-09-21 NOTE — CON
[address]  [address]                    CONSULTATION                  
_______________________________________________________________________________
 
Name:       SHAKIRA LEE                     Room:           [address] [place]  
M.R.#:  [id number]      Account #:      [id number]  
Admission:  09/13/18     Attend Phys:    Guru Hopkins MD 
Discharge:  09/17/18     Date of Birth:  [birth year]  
         Report #: [id number]
                                                                     [id number]  
_______________________________________________________________________________
THIS REPORT FOR:  //name//                      
 
CC: Guru Che
 
DATE OF SERVICE:  09/14/2018
 
 
HISTORY OF PRESENT ILLNESS:  This is an 84-year-old female patient who was
evaluated by me for any neurological etiology for the patient's dizziness. 
History is somewhat poorly defined but looks like this patient is having this
dizziness since February but then, she said she is going to ENT on a regular
basis.  That is for her throat cancer according to her, but they have evaluated
her for what she described as vertigo.  She get these spells that last several
days and she got a similar spell and it is lasting several days.  She does not
know anything, which makes it better or worse.  It came spontaneously without
any trauma.
 
REVIEW OF SYSTEMS:  A 14-point review of system was carried out in this patient.
 She said she has some throat cancer.  Her blood pressure fluctuates and it goes
high.  Sometime, she has ataxia and dizziness.  When she came in, her blood
pressure was pretty high.  She has a history of breast cancer and some ENT
cancer.  She had right knee surgery in the past.  She has a history of
arthritis.  She denies any other 14-point review of systems positive history. 
She is not having any visual, cardiac, respiratory, GI, , musculoskeletal,
constitutional, dermatological, hematological, psychiatric, throat, allergic
symptom associated with present symptomatology.
 
PAST MEDICAL HISTORY:  Negative for any early age stroke.
 
FAMILY HISTORY:  Negative for early age stroke.
 
SOCIAL HISTORY:  She does not smoke.
 
PHYSICAL EXAMINATION:  
NEUROLOGICAL:  Indicate she is alert, responsive.  Her speech, concentration,
fund of knowledge and memory is at her baseline.  Cranial nerve examination 2
through 12 is unremarkable.  Strength, sensation, reflexes and tone is
symmetrical.  There is no meningeal sign.  There is no carotid bruit.  There is
no cerebellar sign.  
GENERAL:  She is moderately built individual who does not have any dysmorphic
features of eyes, ears and face.  
VITAL SIGNS:  Blood pressure is 167/69, respirations 17, pulse is 63 and
temperature is 97.9.  
NECK:  She has no thyroid mass.  There is no carotid bruit.  
HEENT:  Her vision and hearing looks adequate.  
 
 
 
[place] [address]                    CONSULTATION                  
_______________________________________________________________________________
 
Name:       SHAKIRA LEE                     Room:           [address] [place]  
[facility]#:  [id number]      Account #:      [id number]  
Admission:  09/13/18     Attend Phys:    Guru Hopkins MD 
Discharge:  09/17/18     Date of Birth:  [birth year]  
         Report #: [id number]
                                                                     [id number]  
_______________________________________________________________________________
LUNGS:  No respiratory difficulty or rhonchi was noticed.  
CARDIOVASCULAR:  No cardiac abnormality was noted.  
VITAL SIGNS:  As described above.  Blood pressure was pretty high when she came
in.
 
LABORATORY DATA:  Indicate a white count of 4.6.  Sodium is normal.  
 
RADIOLOGICAL DATA:  MRI of the brain and MRA of the head and neck showed some
atherosclerotic disease but nothing which can explain the patient's symptom.
 
IMPRESSION:  It is unlikely that there is any neurological etiology for the
patient's symptoms.  The patient's symptoms are most likely because of systemic
etiologies including other including ENT.  She does have a high LDL and she has
atherosclerotic disease in the brain.  She is on aspirin and statin and she
needs aggressive therapy for that.
 
RECOMMENDATIONS:  As far as dizziness is concerned, I will suggest doing some
systemic workup and management.  I do not believe the symptoms are neurological
in origin.  I do not believe any further neurological workup is indicated.  Now,
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
to be aggressively managed.
 
Thank you very much for this referral.  I discussed this and all other options
including angiogram, workup for vasculitis and she understands that and she
would like to continue management as an outpatient, especially with ENT.
 
 
 
 
 
 
 
 
 
 
 
 
 
 
 
 
 
 
 
<ELECTRONICALLY SIGNED>
                                        By:  Aly Avalos MD             
09/21/18     0932
D: 09/14/18 1536_______________________________________
T: 09/14/18 2154Pnuha Avalos MD                /nt

## 2018-09-24 NOTE — CON
[address]  [address]                    CONSULTATION                  
_______________________________________________________________________________
 
Name:       SHAKIRA LEE                     Room:           [address] [place]  
M.R.#:  [id number]      Account #:      [id number]  
Admission:  09/13/18     Attend Phys:    Guru Hopkins MD 
Discharge:  09/17/18     Date of Birth:  [birth year]  
         Report #: [id number]
                                                                     [id number]  
_______________________________________________________________________________
THIS REPORT FOR:  //name//                      
 
CC: Guru Che 
 
INDICATION:  Stroke.
 
HISTORY OF PRESENT ILLNESS:  The patient is a very pleasant 84-year-old white
female with no prior cardiac history.  She was admitted to the hospital several
days ago with vertigo symptoms.  She also had some left-sided numbness and
tingling.  She still has some slight symptoms on her left side.  The dizziness
has improved significantly.  She is now ambulatory.  She denies any chest pain
or shortness of breath.  She denies any history of atrial fibrillation.  An
echocardiogram showed normal left ventricular systolic function, normal
diastolic function, mild aortic valvular sclerosis without stenosis and no other
significant abnormalities.
 
Cerebrovascular imaging suggests a stroke involving the right pontomedullary
junction suggesting a small right brain stem acute/subacute ischemic infarct. 
No other significant abnormalities were noted.
 
She denies any palpitations.
 
PAST MEDICAL HISTORY:
1.  Hypertension.
2.  Breast cancer, status post right mastectomy.
3.  Arthritis.
4.  Appendectomy.
5.  Oral cancer.
6.  Right knee surgery for a bone chip.
 
FAMILY HISTORY:  Noncontributory.
 
SOCIAL HISTORY:  The patient is a lifelong nonsmoker, drinks alcohol rarely.
 
CURRENT MEDICATIONS:  Lipitor 40 mg p.o. at bedtime, lisinopril 10 mg p.o. every
day, enteric-coated aspirin 325 mg daily, hydrochlorothiazide 12.5 mg daily,
Zofran p.r.n., levothyroxine 0.05 mg daily.
 
ALLERGIES:  CONTRAST DYE, SULFA, CODEINE.
 
REVIEW OF SYSTEMS:
GENERAL:  She denies convulsions, seizures.  She did have some focal
paresthesias without weakness.  In general, there is no unexplained weight loss
or fever.
 
 
 
[place] [address]                    CONSULTATION                  
_______________________________________________________________________________
 
Name:       SHAKIRA LEE                     Room:           [facility]    [facility] [place]  
M.R.#:  [id number]      Account #:      [id number]  
Admission:  09/13/18     Attend Phys:    Guru Hopkins MD 
Discharge:  09/17/18     Date of Birth:  [birth year]  
         Report #: [id number]
                                                                     [id number]  
_______________________________________________________________________________
RESPIRATORY:  She denies cough, sputum production or underlying lung disease.
CARDIAC:  As outlined above.  In addition, she denies orthopnea or paroxysmal
nocturnal dyspnea.  She denies any history of heart murmur or edema.
ENDOCRINE:  No history of diabetes.  She has hypothyroidism.
GASTROINTESTINAL:  No vomiting, hematemesis, melena, hematochezia, jaundice, or
hepatitis.
GENITOURINARY:  No dysuria or hematuria.
HEMATOLOGIC AND LYMPHATIC:  No bleeding disorder.  She reports history of both
oral and breast cancer, status post surgical resection.
ALLERGY AND IMMUNOLOGIC:  She has medical allergies as outlined above.
PSYCHIATRIC:  No depression or anxiety.
MUSCULOSKELETAL:  She has some arthritis without connective tissue disease.
SKIN:  No recent rashes, hives or chronic skin issues.
EYES:  She wears glasses.  She denies any acute loss in vision.
EARS, NOSE, MOUTH, AND THROAT:  She denies decreased hearing or epistaxis.
 
PHYSICAL EXAMINATION:
VITAL SIGNS:  Blood pressure 188/54, pulse 62 and regular.
GENERAL:  This is a very pleasant, elderly female, in no distress.  Mood and
affect appropriate.
HEENT:  Extraocular muscles intact.  Mucous membranes are moist.
NECK:  Shows no jugular venous distention.  There are no carotid bruits.
CHEST:  Reveals clear lung fields without wheezes or rales.
CARDIAC:  Reveals a regular rhythm with normal S1 and S2.  I do not appreciate
gallop or murmur.
ABDOMEN:  Reveals normal bowel sounds.  Abdomen is soft and nontender.
EXTREMITIES:  Shows no edema.  Peripheral pulses are palpable.
SKIN:  Warm and dry.
 
LABORATORY DATA:  A 12-lead EKG shows sinus rhythm with no significant ST or
T-wave abnormalities.  Telemetry monitoring shows sinus rhythm.
 
Labs are reviewed.  Sodium 139, potassium 4.1, chloride 104, bicarbonate 29, BUN
13, creatinine 1.2, serum glucose 90.  LFTs within normal limits.  Troponin is
less than 0.06.  Total cholesterol 218, triglycerides 44, HDL 79, . 
White blood cell count 4.6, hemoglobin 13.6, platelet count 240,000.
 
IMPRESSION AND RECOMMENDATIONS:
1.  Cerebrovascular accident, etiology not clear.  We will arrange 30-day event
monitor to evaluate for possible cardiac dysrhythmias including atrial
fibrillation.  Continue daily aspirin.  Event monitor will be ordered as an
outpatient.
2.  Hypertension.  Continue to make medication adjustments to improve blood
pressure.
3.  Hyperlipidemia.  Continue atorvastatin with goal LDL of 70 or less.
 
 
 
 
[address]  [address]                    CONSULTATION                  
_______________________________________________________________________________
 
Name:       SHAKIRA LEE                     Room:           [address] [place]  
M.R.#:  [id number]      Account #:      [id number]  
Admission:  09/13/18     Attend Phys:    Guru Hopkins MD 
Discharge:  09/17/18     Date of Birth:  [birth year]  
         Report #: [id number]
                                                                     [id number]  
_______________________________________________________________________________
At this point in time, the patient appears stable from a cardiac standpoint. 
Further workup will be obtained as an outpatient.  We will arrange 4-week
followup.
 
 
 
 
 
 
 
 
 
 
 
 
 
 
 
 
 
 
 
 
 
 
 
 
 
 
 
 
 
 
 
 
 
 
 
 
 
 
 
 
 
<ELECTRONICALLY SIGNED>
                                        By:  Hamilton Loving MD, FACC   
09/24/18     1228
D: 09/16/18 1354_______________________________________
T: 09/16/18 2015Hamilton Loving MD, FACC      /nt

## 2018-12-09 ENCOUNTER — HOSPITAL ENCOUNTER (INPATIENT)
Dept: HOSPITAL 35 - ER | Age: 83
LOS: 2 days | Discharge: HOME | DRG: 64 | End: 2018-12-11
Attending: HOSPITALIST | Admitting: HOSPITALIST
Payer: COMMERCIAL

## 2018-12-09 VITALS — DIASTOLIC BLOOD PRESSURE: 62 MMHG | SYSTOLIC BLOOD PRESSURE: 162 MMHG

## 2018-12-09 VITALS — HEIGHT: 64.02 IN | BODY MASS INDEX: 19.82 KG/M2 | WEIGHT: 116.1 LBS

## 2018-12-09 VITALS — DIASTOLIC BLOOD PRESSURE: 52 MMHG | SYSTOLIC BLOOD PRESSURE: 150 MMHG

## 2018-12-09 VITALS — DIASTOLIC BLOOD PRESSURE: 48 MMHG | SYSTOLIC BLOOD PRESSURE: 155 MMHG

## 2018-12-09 VITALS — SYSTOLIC BLOOD PRESSURE: 187 MMHG | DIASTOLIC BLOOD PRESSURE: 61 MMHG

## 2018-12-09 DIAGNOSIS — N17.9: ICD-10-CM

## 2018-12-09 DIAGNOSIS — E43: ICD-10-CM

## 2018-12-09 DIAGNOSIS — E78.5: ICD-10-CM

## 2018-12-09 DIAGNOSIS — R25.3: ICD-10-CM

## 2018-12-09 DIAGNOSIS — E03.9: ICD-10-CM

## 2018-12-09 DIAGNOSIS — R73.9: ICD-10-CM

## 2018-12-09 DIAGNOSIS — I10: ICD-10-CM

## 2018-12-09 DIAGNOSIS — I63.9: Primary | ICD-10-CM

## 2018-12-09 DIAGNOSIS — Z91.041: ICD-10-CM

## 2018-12-09 DIAGNOSIS — Z86.73: ICD-10-CM

## 2018-12-09 LAB
ANION GAP BLD CALC-SCNC: 17 MMOL/L (ref 7–16)
ANION GAP SERPL CALC-SCNC: 6 MMOL/L (ref 7–16)
APTT BLD: 25.4 SECONDS (ref 24.5–32.8)
BASOPHILS NFR BLD AUTO: 0.5 % (ref 0–2)
BUN BLD-MCNC: 17 MG/DL (ref 7–18)
BUN SERPL-MCNC: 18 MG/DL (ref 7–18)
CALCIUM SERPL-MCNC: 9.9 MG/DL (ref 8.5–10.1)
CHLORIDE BLD-SCNC: 96 MMOL/L (ref 98–107)
CHLORIDE SERPL-SCNC: 98 MMOL/L (ref 98–107)
CO2 SERPL-SCNC: 27 MMOL/L (ref 21–32)
CO2 SERPL-SCNC: 28 MMOL/L (ref 21–32)
CREAT SERPL-MCNC: 1.2 MG/DL (ref 0.6–1)
CREAT SERPL-MCNC: 1.2 MG/DL (ref 0.6–1.3)
EOSINOPHIL NFR BLD: 1.6 % (ref 0–3)
ERYTHROCYTE [DISTWIDTH] IN BLOOD BY AUTOMATED COUNT: 15.9 % (ref 10.5–14.5)
GLUCOSE BLD-MCNC: 179 MG/DL (ref 70–99)
GLUCOSE SERPL-MCNC: 184 MG/DL (ref 74–106)
GRANULOCYTES NFR BLD MANUAL: 75.7 % (ref 36–66)
HCT VFR BLD AUTO: 38 % (ref 37–47)
HCT VFR BLD CALC: 37.7 % (ref 37–47)
HGB BLD-MCNC: 12.8 GM/DL (ref 12–15)
HGB BLD-MCNC: 12.9 G/DL (ref 12–15)
INR PPP: 1
LYMPHOCYTES NFR BLD AUTO: 13.6 % (ref 24–44)
MCH RBC QN AUTO: 29.7 PG (ref 26–34)
MCHC RBC AUTO-ENTMCNC: 33.9 G/DL (ref 28–37)
MCV RBC: 87.5 FL (ref 80–100)
MONOCYTES NFR BLD: 8.6 % (ref 1–8)
NEUTROPHILS # BLD: 5.6 THOU/UL (ref 1.4–8.2)
PLATELET # BLD: 276 THOU/UL (ref 150–400)
POC CA IONIZED: 5 MG/DL (ref 4.5–5.3)
POTASSIUM SERPL-SCNC: 3.7 MMOL/L (ref 3.5–5.1)
POTASSIUM SERPL-SCNC: 3.8 MMOL/L (ref 3.5–5.1)
PROTHROMBIN TIME: 10.5 SECONDS (ref 9.3–11.4)
RBC # BLD AUTO: 4.31 MIL/UL (ref 4.2–5)
SODIUM SERPL-SCNC: 132 MMOL/L (ref 136–145)
SODIUM SERPL-SCNC: 135 MMOL/L (ref 136–145)
TROPONIN I SERPL-MCNC: <0.06 NG/ML (ref ?–0.06)
WBC # BLD AUTO: 7.4 THOU/UL (ref 4–11)

## 2018-12-09 PROCEDURE — 10045: CPT

## 2018-12-10 VITALS — SYSTOLIC BLOOD PRESSURE: 115 MMHG | DIASTOLIC BLOOD PRESSURE: 41 MMHG

## 2018-12-10 VITALS — SYSTOLIC BLOOD PRESSURE: 133 MMHG | DIASTOLIC BLOOD PRESSURE: 60 MMHG

## 2018-12-10 VITALS — DIASTOLIC BLOOD PRESSURE: 74 MMHG | SYSTOLIC BLOOD PRESSURE: 176 MMHG

## 2018-12-10 VITALS — DIASTOLIC BLOOD PRESSURE: 62 MMHG | SYSTOLIC BLOOD PRESSURE: 141 MMHG

## 2018-12-10 LAB
ANION GAP SERPL CALC-SCNC: 6 MMOL/L (ref 7–16)
BUN SERPL-MCNC: 13 MG/DL (ref 7–18)
CALCIUM SERPL-MCNC: 9.7 MG/DL (ref 8.5–10.1)
CHLORIDE SERPL-SCNC: 102 MMOL/L (ref 98–107)
CO2 SERPL-SCNC: 27 MMOL/L (ref 21–32)
CREAT SERPL-MCNC: 1 MG/DL (ref 0.6–1)
ERYTHROCYTE [DISTWIDTH] IN BLOOD BY AUTOMATED COUNT: 15.8 % (ref 10.5–14.5)
GLUCOSE SERPL-MCNC: 98 MG/DL (ref 74–106)
HCT VFR BLD CALC: 36.5 % (ref 37–47)
HGB BLD-MCNC: 12.3 GM/DL (ref 12–15)
INR PPP: 1
MCH RBC QN AUTO: 29.4 PG (ref 26–34)
MCHC RBC AUTO-ENTMCNC: 33.6 G/DL (ref 28–37)
MCV RBC: 87.3 FL (ref 80–100)
PLATELET # BLD: 246 THOU/UL (ref 150–400)
POTASSIUM SERPL-SCNC: 3.6 MMOL/L (ref 3.5–5.1)
PROTHROMBIN TIME: 10.5 SECONDS (ref 9.3–11.4)
RBC # BLD AUTO: 4.18 MIL/UL (ref 4.2–5)
SODIUM SERPL-SCNC: 135 MMOL/L (ref 136–145)
WBC # BLD AUTO: 6.8 THOU/UL (ref 4–11)

## 2018-12-11 VITALS — DIASTOLIC BLOOD PRESSURE: 55 MMHG | SYSTOLIC BLOOD PRESSURE: 143 MMHG

## 2018-12-11 VITALS — SYSTOLIC BLOOD PRESSURE: 139 MMHG | DIASTOLIC BLOOD PRESSURE: 62 MMHG

## 2018-12-11 VITALS — DIASTOLIC BLOOD PRESSURE: 59 MMHG | SYSTOLIC BLOOD PRESSURE: 147 MMHG

## 2018-12-11 VITALS — SYSTOLIC BLOOD PRESSURE: 147 MMHG | DIASTOLIC BLOOD PRESSURE: 59 MMHG
